# Patient Record
Sex: MALE | Race: WHITE | NOT HISPANIC OR LATINO | Employment: FULL TIME | URBAN - METROPOLITAN AREA
[De-identification: names, ages, dates, MRNs, and addresses within clinical notes are randomized per-mention and may not be internally consistent; named-entity substitution may affect disease eponyms.]

---

## 2017-08-16 ENCOUNTER — PATIENT OUTREACH (OUTPATIENT)
Dept: HEALTH INFORMATION MANAGEMENT | Facility: OTHER | Age: 72
End: 2017-08-16

## 2017-08-16 NOTE — PROGRESS NOTES
Attempt #:1    WebIZ Checked & Epic Updated: no  [facility] Verified: no  Verify PCP: yes    Communication Preference Obtained: no     Review Care Team: yes    Annual Wellness Visit Scheduling  1. Scheduling Status:Not Scheduled. Patient states they have already completed their AWV          Care Gap Scheduling (Attempt to Schedule EACH Overdue Care Gap!)     Health Maintenance Due   Topic Date Due   • IMM ZOSTER VACCINE  NON [facility] PCP          [employer] Activation: already active  [employer] Kwesi: no  Virtual Visits: no  Opt In to Text Messages: no

## 2018-03-08 PROBLEM — M51.26 LUMBAR DISC HERNIATION: Status: ACTIVE | Noted: 2018-03-08

## 2019-08-02 PROBLEM — G40.209 NONINTRACTABLE EPILEPSY WITH COMPLEX PARTIAL SEIZURES (HCC): Status: ACTIVE | Noted: 2019-08-02

## 2020-02-25 PROBLEM — D12.6 TUBULAR ADENOMA OF COLON: Status: ACTIVE | Noted: 2020-02-25

## 2020-02-25 PROBLEM — K57.90 DIVERTICULOSIS: Status: ACTIVE | Noted: 2020-02-25

## 2021-06-02 PROBLEM — D12.6 TUBULAR ADENOMA OF COLON: Status: RESOLVED | Noted: 2020-02-25 | Resolved: 2021-06-02

## 2022-10-18 PROBLEM — O10.019: Status: RESOLVED | Noted: 2022-10-18 | Resolved: 2022-10-18

## 2022-10-18 PROBLEM — I10 BENIGN ESSENTIAL HTN: Status: ACTIVE | Noted: 2022-10-18

## 2022-10-18 PROBLEM — O10.019: Status: ACTIVE | Noted: 2022-10-18

## 2022-12-22 PROBLEM — G89.29 CHRONIC MIDLINE LOW BACK PAIN WITHOUT SCIATICA: Status: ACTIVE | Noted: 2022-12-22

## 2022-12-22 PROBLEM — Z85.841 HISTORY OF BRAIN CANCER: Status: ACTIVE | Noted: 2022-12-22

## 2022-12-22 PROBLEM — M54.50 CHRONIC MIDLINE LOW BACK PAIN WITHOUT SCIATICA: Status: ACTIVE | Noted: 2022-12-22

## 2023-06-23 PROBLEM — Z78.9 FULL CODE STATUS: Status: ACTIVE | Noted: 2023-06-23
